# Patient Record
Sex: MALE | Race: AMERICAN INDIAN OR ALASKA NATIVE
[De-identification: names, ages, dates, MRNs, and addresses within clinical notes are randomized per-mention and may not be internally consistent; named-entity substitution may affect disease eponyms.]

---
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
## 2021-04-24 ENCOUNTER — HOSPITAL ENCOUNTER (EMERGENCY)
Dept: HOSPITAL 5 - ED | Age: 21
Discharge: HOME | End: 2021-04-24
Payer: COMMERCIAL

## 2021-04-24 VITALS — SYSTOLIC BLOOD PRESSURE: 127 MMHG | DIASTOLIC BLOOD PRESSURE: 87 MMHG
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
## 2021-04-24 DIAGNOSIS — Z79.1: ICD-10-CM

## 2021-04-24 DIAGNOSIS — S09.90XA: Primary | ICD-10-CM
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
## 2021-04-24 DIAGNOSIS — Z79.899: ICD-10-CM

## 2021-04-24 DIAGNOSIS — Z88.0: ICD-10-CM

## 2021-04-24 PROCEDURE — 99282 EMERGENCY DEPT VISIT SF MDM: CPT

## 2021-04-24 NOTE — EMERGENCY DEPARTMENT REPORT
ED Head Trauma HPI





- General


Chief complaint: Headache


Stated complaint: HEAD INJURY


Time Seen by Provider: 04/24/21 10:59


Source: patient


Mode of arrival: Ambulatory


Limitations: No Limitations





- History of Present Illness


Initial comments: 





This is a 20-year-old male employed at [employer] he states at around 2:25 PM 

yesterday a box fell and struck him on the back of the head   He denies any loss

of consciousness.  He took an aspirin  and has been resting at home. He woke up 

this morning complaining of headache dizziness nausea he denies any vomiting.  

He denies any past medical history.  He is well-appearing with steady gait and 

is in no acute distress


MD Complaint: head injury


-: Sudden (Friday, 4/23/2021 approximately 2:25 PM)


Mechanism of Injury: work related injury


Loss of Consciousness: no


Place: work


Radiation: none


Severity scale (0 -10): 8


Quality: aching


Consistency: constant


Provoking factors: none known


Other Injuries: none


Associated Symptoms: nausea, other (Periods of dizziness off-and-on).  denies: 

confusion, vomiting, weakness, tingling





- Related Data


                                  Previous Rx's











 Medication  Instructions  Recorded  Last Taken  Type


 


Acetaminophen [Tylenol Extra 500 mg PO Q8HR #20 tablet 07/25/18 Unknown Rx





Strength]    


 


Ondansetron [Zofran Odt] 4 mg PO Q8HR PRN #20 tab.rapdis 07/25/18 Unknown Rx


 


Famotidine [Pepcid] 20 mg PO BID #60 tablet 02/01/20 Unknown Rx


 


Ibuprofen [Motrin] 800 mg PO Q8HR PRN #21 tablet 04/24/21 Unknown Rx











Allergies/Adverse reactions: 


                                    Allergies











Allergy/AdvReac Type Severity Reaction Status Date / Time


 


Penicillins Allergy  Hives Verified 07/25/18 09:10














ED Review of Systems


ROS: 


Stated complaint: HEAD INJURY


Other details as noted in HPI





Comment: All other systems reviewed and negative


Constitutional: denies: chills, fever, malaise


ENT: denies: ear pain, throat pain


Respiratory: denies: cough, shortness of breath, wheezing


Cardiovascular: denies: chest pain, palpitations, dyspnea on exertion, edema, 

syncope, paroxysmal nocturnal dyspnea


Gastrointestinal: denies: abdominal pain, nausea, vomiting


Neurological: headache, other (Dizziness).  denies: numbness, confusion, 

abnormal gait, vertigo


Psychiatric: denies: anxiety, depression





ED Past Medical Hx





- Past Medical History


Previous Medical History?: No





- Surgical History


Past Surgical History?: No





- Social History


Smoking Status: Never Smoker


Substance Use Type: None





- Medications


Home Medications: 


                                Home Medications











 Medication  Instructions  Recorded  Confirmed  Last Taken  Type


 


Acetaminophen [Tylenol Extra 500 mg PO Q8HR #20 tablet 07/25/18  Unknown Rx





Strength]     


 


Ondansetron [Zofran Odt] 4 mg PO Q8HR PRN #20 tab.rapdis 07/25/18  Unknown Rx


 


Famotidine [Pepcid] 20 mg PO BID #60 tablet 02/01/20  Unknown Rx


 


Ibuprofen [Motrin] 800 mg PO Q8HR PRN #21 tablet 04/24/21  Unknown Rx














ED Physical Exam





- General


Limitations: No Limitations


General appearance: alert, in no apparent distress





- Head


Head exam: Present: atraumatic, other (No swelling no hematoma skin intact no 

sign of injury to the posterior scalp)





- Eye


Eye exam: Present: normal appearance, PERRL, EOMI





- ENT


ENT exam: Present: normal exam, mucous membranes moist





- Neck


Neck exam: Present: normal inspection, full ROM





- Respiratory


Respiratory exam: Present: normal lung sounds bilaterally.  Absent: respiratory 

distress, wheezes, rales, rhonchi





- Cardiovascular


Cardiovascular Exam: Present: regular rate, normal heart sounds





- Extremities Exam


Extremities exam: Present: normal inspection, full ROM, other (Walking with 

steady gait)





- Back Exam


Back exam: Present: normal inspection, full ROM, other (Able to bend over and 

touch his toes)





- Neurological Exam


Neurological exam: Present: alert, oriented X3, CN II-XII intact, normal gait.  

Absent: motor sensory deficit





- Psychiatric


Psychiatric exam: Present: normal affect





- Skin


Skin exam: Present: warm, dry, intact





ED Course


                                   Vital Signs











  04/24/21





  10:24


 


Temperature 98.2 F


 


Pulse Rate 82


 


Respiratory 20





Rate 


 


Blood Pressure 127/87


 


O2 Sat by Pulse 99





Oximetry 














- Reevaluation(s)


Reevaluation #1: 





04/24/21 11:46


Nurse in to discharge patient patient refused Tylenol states that Tylenol is not

 going to work he needs something stronger I explained to patient that Tylenol 

would be the best mode of treatment in lieu of a head injury.  He insists that 

he needs something stronger I explained that narcotic medication is not 

indicated at this time and the preference would be Tylenol patient insisted he 

needed something else I prescribed Motrin 800 as needed for headache.





- Medical Decision Making





20-year-old male states that while working yesterday at [employer] a box fell on the

 back of his head.  He denies any loss of consciousness.  He states he went home

 took  1 dose of aspirin and has been resting.  Today he reports headache that 

has not improved and has not taken any additional pain medicine at home.  On 

examination no scalp swelling or signs of injury scalp intact , his pupils are 

equal and reactive ,extraocular movement intact his equal upper and lower muscle

 strength.  He has negative Romberg.  His gait is steady.  He is moving all 

extremities and he is in no distress with no neurological deficit.  Plan is for 

patient to follow-up with his primary care doctor, rest take concussion 

precautions which includes decreasing use of electronics and hydration





- NEXUS Criteria


Focal neurological deficit present: No


Midline spinal tenderness present: No


Altered level of consciousness: No


Intoxication present: No


Distracting injury present: No


NEXUS results: C-Spine can be cleared clinically by these results. Imaging is 

not required.


Critical Care Time: No


Critical care attestation.: 


If time is entered above; I have spent that time in minutes in the direct care 

of this critically ill patient, excluding procedure time.








ED Disposition


Clinical Impression: 


Head injury due to trauma


Qualifiers:


 Encounter type: initial encounter Qualified Code(s): S09.90XA - Unspecified 

injury of head, initial encounter





Disposition: DC-01 TO HOME OR SELFCARE


Is pt being admited?: No


Does the pt Need Aspirin: No


Condition: Stable


Instructions:  Head Injury, Adult


Additional Instructions: 


Rest keep yourself well-hydrated continue to take Tylenol 325 mg 1 to 2 tablets 

every 4-6 hours as needed for pain.  Follow-up with your PCP in 3 to 5 days or 

return to the emergency room for any worsening symptoms


Prescriptions: 


Ibuprofen [Motrin] 800 mg PO Q8HR PRN #21 tablet


 PRN Reason: Headache


Referrals: 


PRIMARY CARE,MD [Primary Care Provider] - 3-5 Days


GENNY DIOR MD [Staff Physician] - 3-5 Days


Forms:  Work/School Release Form(ED)


Time of Disposition: 11:12

## 2022-08-07 ENCOUNTER — HOSPITAL ENCOUNTER (EMERGENCY)
Dept: HOSPITAL 5 - ED | Age: 22
Discharge: HOME | End: 2022-08-07
Payer: MEDICAID

## 2022-08-07 VITALS — DIASTOLIC BLOOD PRESSURE: 75 MMHG | SYSTOLIC BLOOD PRESSURE: 121 MMHG
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
## 2022-08-07 DIAGNOSIS — S20.419A: ICD-10-CM

## 2022-08-07 DIAGNOSIS — S40.212A: Primary | ICD-10-CM
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
## 2022-08-07 PROCEDURE — 99283 EMERGENCY DEPT VISIT LOW MDM: CPT

## 2022-08-07 PROCEDURE — 72040 X-RAY EXAM NECK SPINE 2-3 VW: CPT

## 2022-08-07 PROCEDURE — 72070 X-RAY EXAM THORAC SPINE 2VWS: CPT

## 2022-08-07 PROCEDURE — 72100 X-RAY EXAM L-S SPINE 2/3 VWS: CPT
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
